# Patient Record
Sex: FEMALE | URBAN - METROPOLITAN AREA
[De-identification: names, ages, dates, MRNs, and addresses within clinical notes are randomized per-mention and may not be internally consistent; named-entity substitution may affect disease eponyms.]

---

## 2021-01-20 ENCOUNTER — IMMUNIZATIONS (OUTPATIENT)
Dept: FAMILY MEDICINE CLINIC | Facility: HOSPITAL | Age: 51
End: 2021-01-20

## 2021-01-20 DIAGNOSIS — Z23 ENCOUNTER FOR IMMUNIZATION: Primary | ICD-10-CM

## 2021-01-20 PROCEDURE — 0001A SARS-COV-2 / COVID-19 MRNA VACCINE (PFIZER-BIONTECH) 30 MCG: CPT

## 2021-01-20 PROCEDURE — 91300 SARS-COV-2 / COVID-19 MRNA VACCINE (PFIZER-BIONTECH) 30 MCG: CPT

## 2021-02-12 ENCOUNTER — IMMUNIZATIONS (OUTPATIENT)
Dept: FAMILY MEDICINE CLINIC | Facility: HOSPITAL | Age: 51
End: 2021-02-12

## 2021-02-12 DIAGNOSIS — Z23 ENCOUNTER FOR IMMUNIZATION: Primary | ICD-10-CM

## 2021-02-12 PROCEDURE — 0002A SARS-COV-2 / COVID-19 MRNA VACCINE (PFIZER-BIONTECH) 30 MCG: CPT

## 2021-02-12 PROCEDURE — 91300 SARS-COV-2 / COVID-19 MRNA VACCINE (PFIZER-BIONTECH) 30 MCG: CPT

## 2021-07-01 ENCOUNTER — NEW PATIENT (OUTPATIENT)
Dept: URBAN - METROPOLITAN AREA CLINIC 44 | Facility: CLINIC | Age: 51
End: 2021-07-01

## 2021-07-01 DIAGNOSIS — H52.13: ICD-10-CM

## 2021-07-01 DIAGNOSIS — H43.811: ICD-10-CM

## 2021-07-01 DIAGNOSIS — H26.003: ICD-10-CM

## 2021-07-01 DIAGNOSIS — H35.371: ICD-10-CM

## 2021-07-01 PROCEDURE — 99244 OFF/OP CNSLTJ NEW/EST MOD 40: CPT

## 2021-07-01 PROCEDURE — 92201 OPSCPY EXTND RTA DRAW UNI/BI: CPT

## 2021-07-01 PROCEDURE — 92134 CPTRZ OPH DX IMG PST SGM RTA: CPT

## 2021-07-01 ASSESSMENT — VISUAL ACUITY
OD_CC: 20/30
OS_CC: 20/25

## 2021-07-01 ASSESSMENT — TONOMETRY
OS_IOP_MMHG: 15
OD_IOP_MMHG: 16

## 2023-11-16 ENCOUNTER — OCCMED (OUTPATIENT)
Dept: URGENT CARE | Facility: CLINIC | Age: 53
End: 2023-11-16

## 2023-11-16 ENCOUNTER — APPOINTMENT (OUTPATIENT)
Dept: LAB | Facility: CLINIC | Age: 53
End: 2023-11-16

## 2023-11-16 DIAGNOSIS — Z02.1 PRE-EMPLOYMENT HEALTH SCREENING EXAMINATION: ICD-10-CM

## 2023-11-16 DIAGNOSIS — Z02.1 PRE-EMPLOYMENT HEALTH SCREENING EXAMINATION: Primary | ICD-10-CM

## 2023-11-16 LAB
MEV IGG SER QL IA: ABNORMAL
MUV IGG SER QL IA: NORMAL
RUBV IGG SERPL IA-ACNC: 52 IU/ML
VZV IGG SER QL IA: NORMAL

## 2023-11-16 PROCEDURE — 86762 RUBELLA ANTIBODY: CPT

## 2023-11-16 PROCEDURE — 86735 MUMPS ANTIBODY: CPT

## 2023-11-16 PROCEDURE — 36415 COLL VENOUS BLD VENIPUNCTURE: CPT

## 2023-11-16 PROCEDURE — 86480 TB TEST CELL IMMUN MEASURE: CPT

## 2023-11-16 PROCEDURE — 86787 VARICELLA-ZOSTER ANTIBODY: CPT

## 2023-11-16 PROCEDURE — 86765 RUBEOLA ANTIBODY: CPT

## 2023-11-17 LAB
GAMMA INTERFERON BACKGROUND BLD IA-ACNC: <0 IU/ML
M TB IFN-G BLD-IMP: NEGATIVE
M TB IFN-G CD4+ BCKGRND COR BLD-ACNC: 0 IU/ML
M TB IFN-G CD4+ BCKGRND COR BLD-ACNC: 0 IU/ML
MITOGEN IGNF BCKGRD COR BLD-ACNC: 10 IU/ML

## 2024-11-13 ENCOUNTER — OFFICE VISIT (OUTPATIENT)
Dept: FAMILY MEDICINE CLINIC | Facility: CLINIC | Age: 54
End: 2024-11-13
Payer: COMMERCIAL

## 2024-11-13 VITALS
HEART RATE: 63 BPM | HEIGHT: 63 IN | DIASTOLIC BLOOD PRESSURE: 70 MMHG | RESPIRATION RATE: 18 BRPM | OXYGEN SATURATION: 97 % | SYSTOLIC BLOOD PRESSURE: 122 MMHG | TEMPERATURE: 97 F | BODY MASS INDEX: 21.44 KG/M2 | WEIGHT: 121 LBS

## 2024-11-13 DIAGNOSIS — Z13.29 SCREENING FOR THYROID DISORDER: ICD-10-CM

## 2024-11-13 DIAGNOSIS — Z11.4 SCREENING FOR HIV (HUMAN IMMUNODEFICIENCY VIRUS): ICD-10-CM

## 2024-11-13 DIAGNOSIS — G57.01 NEUROPATHY OF RIGHT SCIATIC NERVE: Primary | ICD-10-CM

## 2024-11-13 DIAGNOSIS — Z13.6 SCREENING FOR CARDIOVASCULAR CONDITION: ICD-10-CM

## 2024-11-13 DIAGNOSIS — Z11.59 NEED FOR HEPATITIS C SCREENING TEST: ICD-10-CM

## 2024-11-13 DIAGNOSIS — Z86.2 HISTORY OF ANEMIA: ICD-10-CM

## 2024-11-13 PROCEDURE — 99203 OFFICE O/P NEW LOW 30 MIN: CPT | Performed by: NURSE PRACTITIONER

## 2024-11-13 RX ORDER — GABAPENTIN 300 MG/1
300 CAPSULE ORAL
COMMUNITY
Start: 2024-08-02

## 2024-11-13 RX ORDER — GABAPENTIN 100 MG/1
100 CAPSULE ORAL DAILY
COMMUNITY
Start: 2024-06-26

## 2024-11-13 NOTE — PROGRESS NOTES
Ambulatory Visit  Name: Mary Gonzalez      : 1970      MRN: [id number]  Encounter Provider: TERESA Pineda  Encounter Date: 2024   Encounter department: [facility]    Assessment & Plan  Neuropathy of right sciatic nerve  Has f/u with spine and ortho scheduled        Screening for cardiovascular condition    Orders:    CBC and differential; Future    Comprehensive metabolic panel; Future    Lipid Panel with LDL/HDL Ratio; Future    Screening for thyroid disorder    Orders:    TSH, 3rd generation with Free T4 reflex; Future    History of anemia    Orders:    Fe+TIBC+Palomo; Future    Need for hepatitis C screening test    Orders:    Hepatitis C antibody; Future    Screening for HIV (human immunodeficiency virus)    Orders:    Human Immunodeficiency Virus 1/2 Antigen / Antibody ( Fourth Generation) with Reflex Testing; Future       History of Present Illness     Here today to establish care.  She had a hysterectomy 6 months ago and had foot drop immediately following surgery.  She has had imaging and EMGs done, has had some improvement in function and sensation.  Has f/u arranged and will be going for second opinion at [facility] next week.   She has been in PT since then.  No other significant PMH.  She is requesting labs to f/u on her anemia since her surgery.        History obtained from : patient  Review of Systems   Constitutional: Negative.    Respiratory: Negative.     Cardiovascular: Negative.    Gastrointestinal: Negative.    Musculoskeletal:  Positive for back pain.   Neurological:  Positive for weakness and numbness.     Current Outpatient Medications on File Prior to Visit   Medication Sig Dispense Refill    gabapentin (NEURONTIN) 100 mg capsule Take 100 mg by mouth daily (Patient taking differently: Take 100 mg by mouth if needed)      gabapentin (NEURONTIN) 300 mg capsule Take 300 mg by mouth daily at bedtime       No current facility-administered medications on file prior  "to visit.      Social History     Tobacco Use    Smoking status: Never     Passive exposure: Never    Smokeless tobacco: Never   Vaping Use    Vaping status: Never Used   Substance and Sexual Activity    Alcohol use: Never    Drug use: Never    Sexual activity: Not on file         Objective     /70   Pulse 63   Temp (!) 97 °F (36.1 °C) (Skin)   Resp 18   Ht 5' 3\" (1.6 m)   Wt 54.9 kg (121 lb)   SpO2 97%   BMI 21.43 kg/m²     Physical Exam  Vitals and nursing note reviewed.   Constitutional:       General: She is not in acute distress.     Appearance: Normal appearance.   HENT:      Head: Normocephalic and atraumatic.   Eyes:      Conjunctiva/sclera: Conjunctivae normal.   Neck:      Vascular: No carotid bruit.   Cardiovascular:      Rate and Rhythm: Normal rate and regular rhythm.      Pulses: Normal pulses.      Heart sounds: Normal heart sounds. No murmur heard.  Pulmonary:      Effort: Pulmonary effort is normal.      Breath sounds: Normal breath sounds.   Skin:     General: Skin is warm and dry.   Neurological:      Mental Status: She is alert.   Psychiatric:         Mood and Affect: Mood normal.         Behavior: Behavior normal.         "

## 2024-11-27 LAB
FERRITIN SERPL-MCNC: 47 NG/ML (ref 15–150)
IRON SATN MFR SERPL: 24 % (ref 15–55)
IRON SERPL-MCNC: 78 UG/DL (ref 27–159)
TIBC SERPL-MCNC: 324 UG/DL (ref 250–450)
UIBC SERPL-MCNC: 246 UG/DL (ref 131–425)

## 2024-11-28 LAB
ALBUMIN SERPL-MCNC: 4.4 G/DL (ref 3.8–4.9)
ALP SERPL-CCNC: 81 IU/L (ref 44–121)
ALT SERPL-CCNC: 20 IU/L (ref 0–32)
AST SERPL-CCNC: 18 IU/L (ref 0–40)
BASOPHILS # BLD AUTO: 0 X10E3/UL (ref 0–0.2)
BASOPHILS NFR BLD AUTO: 0 %
BILIRUB SERPL-MCNC: 0.5 MG/DL (ref 0–1.2)
BUN SERPL-MCNC: 14 MG/DL (ref 6–24)
BUN/CREAT SERPL: 18 (ref 9–23)
CALCIUM SERPL-MCNC: 9.6 MG/DL (ref 8.7–10.2)
CHLORIDE SERPL-SCNC: 102 MMOL/L (ref 96–106)
CHOLEST SERPL-MCNC: 159 MG/DL (ref 100–199)
CO2 SERPL-SCNC: 26 MMOL/L (ref 20–29)
CREAT SERPL-MCNC: 0.79 MG/DL (ref 0.57–1)
EGFR: 90 ML/MIN/1.73
EOSINOPHIL # BLD AUTO: 0.1 X10E3/UL (ref 0–0.4)
EOSINOPHIL NFR BLD AUTO: 1 %
ERYTHROCYTE [DISTWIDTH] IN BLOOD BY AUTOMATED COUNT: 12.6 % (ref 11.7–15.4)
GLOBULIN SER-MCNC: 2.2 G/DL (ref 1.5–4.5)
GLUCOSE SERPL-MCNC: 85 MG/DL (ref 70–99)
HCT VFR BLD AUTO: 39.7 % (ref 34–46.6)
HCV AB S/CO SERPL IA: NON REACTIVE
HDLC SERPL-MCNC: 67 MG/DL
HGB BLD-MCNC: 12.2 G/DL (ref 11.1–15.9)
HIV 1+2 AB+HIV1 P24 AG SERPL QL IA: NON REACTIVE
IMM GRANULOCYTES # BLD: 0 X10E3/UL (ref 0–0.1)
IMM GRANULOCYTES NFR BLD: 0 %
LDLC SERPL CALC-MCNC: 81 MG/DL (ref 0–99)
LDLC/HDLC SERPL: 1.2 RATIO (ref 0–3.2)
LYMPHOCYTES # BLD AUTO: 0.6 X10E3/UL (ref 0.7–3.1)
LYMPHOCYTES NFR BLD AUTO: 13 %
MCH RBC QN AUTO: 27.6 PG (ref 26.6–33)
MCHC RBC AUTO-ENTMCNC: 30.7 G/DL (ref 31.5–35.7)
MCV RBC AUTO: 90 FL (ref 79–97)
MICRODELETION SYND BLD/T FISH: NORMAL
MONOCYTES # BLD AUTO: 0.3 X10E3/UL (ref 0.1–0.9)
MONOCYTES NFR BLD AUTO: 6 %
NEUTROPHILS # BLD AUTO: 3.9 X10E3/UL (ref 1.4–7)
NEUTROPHILS NFR BLD AUTO: 80 %
PLATELET # BLD AUTO: 261 X10E3/UL (ref 150–450)
POTASSIUM SERPL-SCNC: 4.5 MMOL/L (ref 3.5–5.2)
PROT SERPL-MCNC: 6.6 G/DL (ref 6–8.5)
RBC # BLD AUTO: 4.42 X10E6/UL (ref 3.77–5.28)
SL AMB VLDL CHOLESTEROL CALC: 11 MG/DL (ref 5–40)
SODIUM SERPL-SCNC: 141 MMOL/L (ref 134–144)
TRIGL SERPL-MCNC: 51 MG/DL (ref 0–149)
TSH SERPL DL<=0.005 MIU/L-ACNC: 1.44 UIU/ML (ref 0.45–4.5)
WBC # BLD AUTO: 4.9 X10E3/UL (ref 3.4–10.8)

## 2024-11-29 ENCOUNTER — RESULTS FOLLOW-UP (OUTPATIENT)
Dept: FAMILY MEDICINE CLINIC | Facility: CLINIC | Age: 54
End: 2024-11-29

## 2025-03-12 ENCOUNTER — OFFICE VISIT (OUTPATIENT)
Dept: URGENT CARE | Facility: CLINIC | Age: 55
End: 2025-03-12
Payer: COMMERCIAL

## 2025-03-12 VITALS
HEART RATE: 82 BPM | RESPIRATION RATE: 14 BRPM | BODY MASS INDEX: 21.97 KG/M2 | SYSTOLIC BLOOD PRESSURE: 112 MMHG | TEMPERATURE: 97 F | WEIGHT: 124 LBS | OXYGEN SATURATION: 97 % | DIASTOLIC BLOOD PRESSURE: 78 MMHG

## 2025-03-12 DIAGNOSIS — N39.0 URINARY TRACT INFECTION WITH HEMATURIA, SITE UNSPECIFIED: Primary | ICD-10-CM

## 2025-03-12 DIAGNOSIS — R31.9 URINARY TRACT INFECTION WITH HEMATURIA, SITE UNSPECIFIED: Primary | ICD-10-CM

## 2025-03-12 LAB
SL AMB  POCT GLUCOSE, UA: NEGATIVE
SL AMB LEUKOCYTE ESTERASE,UA: ABNORMAL
SL AMB POCT BILIRUBIN,UA: NEGATIVE
SL AMB POCT BLOOD,UA: ABNORMAL
SL AMB POCT CLARITY,UA: ABNORMAL
SL AMB POCT COLOR,UA: ABNORMAL
SL AMB POCT KETONES,UA: NEGATIVE
SL AMB POCT NITRITE,UA: POSITIVE
SL AMB POCT PH,UA: 6.5
SL AMB POCT SPECIFIC GRAVITY,UA: 1.02
SL AMB POCT URINE PROTEIN: 100
SL AMB POCT UROBILINOGEN: 0.2

## 2025-03-12 PROCEDURE — 99214 OFFICE O/P EST MOD 30 MIN: CPT

## 2025-03-12 PROCEDURE — 81002 URINALYSIS NONAUTO W/O SCOPE: CPT

## 2025-03-12 RX ORDER — NITROFURANTOIN 25; 75 MG/1; MG/1
100 CAPSULE ORAL 2 TIMES DAILY
Qty: 10 CAPSULE | Refills: 0 | Status: SHIPPED | OUTPATIENT
Start: 2025-03-12 | End: 2025-03-17

## 2025-03-12 NOTE — PROGRESS NOTES
[facility]        NAME: Mary Gonzalez is a 54 y.o. female  : 1970    MRN: [id number]  DATE: 2025  TIME: 7:58 PM    Assessment and Plan   Urinary tract infection with hematuria, site unspecified [N39.0, R31.9]  1. Urinary tract infection with hematuria, site unspecified  Urine culture    POCT urine dip    nitrofurantoin (MACROBID) 100 mg capsule    Urine culture        Urine dip with moderate leukocytes, positive nitrates, and large blood. Will treat and send for culture.     Patient Instructions     Urinary Tract Infection in Women   WHAT YOU NEED TO KNOW:   A urinary tract infection (UTI) is caused by bacteria that get inside your urinary tract. Most bacteria that enter your urinary tract come out when you urinate. If the bacteria stay in your urinary tract, you may get an infection. Your urinary tract includes your kidneys, ureters, bladder, and urethra. Urine is made in your kidneys, and it flows from the ureters to the bladder. Urine leaves the bladder through the urethra. A UTI is more common in your lower urinary tract, which includes your bladder and urethra.      DISCHARGE INSTRUCTIONS:   Return to the emergency department if:   You are urinating very little or not at all.     You have a high fever with shaking chills.      You have side or back pain that gets worse.     Call your doctor if:   You have a fever.     You do not feel better after 2 days of taking antibiotics.     You are vomiting.      You have questions or concerns about your condition or care.     Medicines:   Antibiotics  help fight a bacterial infection. If you have UTIs often (called recurrent UTIs), you may be given antibiotics to take regularly. You will be given directions for when and how to use antibiotics. The goal is to prevent UTIs but not cause antibiotic resistance by using antibiotics too often.     Medicines  may be given to decrease pain and burning when you urinate. They will also help decrease  the feeling that you need to urinate often. These medicines will make your urine orange or red.     Take your medicine as directed.  Contact your healthcare provider if you think your medicine is not helping or if you have side effects. Tell him or her if you are allergic to any medicine. Keep a list of the medicines, vitamins, and herbs you take. Include the amounts, and when and why you take them. Bring the list or the pill bottles to follow-up visits. Carry your medicine list with you in case of an emergency.     Follow up with your healthcare provider as directed:  Write down your questions so you remember to ask them during your visits.   Prevent another UTI:   Empty your bladder often.  Urinate and empty your bladder as soon as you feel the need. Do not hold your urine for long periods of time.     Wipe from front to back after you urinate or have a bowel movement.  This will help prevent germs from getting into your urinary tract through your urethra.     Drink liquids as directed.  Ask how much liquid to drink each day and which liquids are best for you. You may need to drink more liquids than usual to help flush out the bacteria. Do not drink alcohol, caffeine, or citrus juices. These can irritate your bladder and increase your symptoms. Your healthcare provider may recommend cranberry juice to help prevent a UTI.     Urinate after you have sex.  This can help flush out bacteria passed during sex.     Do not douche or use feminine deodorants.  These can change the chemical balance in your vagina.     Change sanitary pads or tampons often.  This will help prevent germs from getting into your urinary tract.      Talk to your healthcare provider about your birth control method.  You may need to change your method if it is increasing your risk for UTIs.     Wear cotton underwear and clothes that are loose.  Tight pants and nylon underwear can trap moisture and cause bacteria to grow.     Vaginal estrogen may be  recommended.  This medicine helps prevent UTIs in women who have gone through menopause or are in oliva-menopause.     Do pelvic muscle exercises often.  Pelvic muscle exercises may help you start and stop urinating. Strong pelvic muscles may help you empty your bladder easier. Squeeze these muscles tightly for 5 seconds like you are trying to hold back urine. Then relax for 5 seconds. Gradually work up to squeezing for 10 seconds. Do 3 sets of 15 repetitions a day, or as directed.     © Copyright [employer] 2021 Information is for End User's use only and may not be sold, redistributed or otherwise used for commercial purposes. All illustrations and images included in CareNotes® are the copyrighted property of [employer]. or [employer]  The above information is an  only. It is not intended as medical advice for individual conditions or treatments. Talk to your doctor, nurse or pharmacist before following any medical regimen to see if it is safe and effective for you.    Follow up with PCP in 3-5 days.  Proceed to  ER if symptoms worsen.    If tests are performed, our office will contact you with results only if changes need to made to the care plan discussed with you at the visit. You can review your full results on [facility] Mychart.    Chief Complaint     Chief Complaint   Patient presents with    Possible UTI     Pt presents with burning sensation that started this evening         History of Present Illness       Urinary Tract Infection   This is a new problem. The current episode started today. The problem occurs every urination. The problem has been unchanged. The quality of the pain is described as burning. The pain is moderate. There has been no fever. Associated symptoms include frequency and hematuria. Pertinent negatives include no chills, nausea or vomiting. She has tried nothing for the symptoms.       Review of Systems   Review of Systems   Constitutional:  Negative for  chills and fever.   HENT:  Negative for congestion, postnasal drip, rhinorrhea, sinus pressure, sore throat and trouble swallowing.    Respiratory:  Negative for cough, chest tightness and shortness of breath.    Cardiovascular:  Negative for chest pain and palpitations.   Gastrointestinal:  Negative for abdominal pain, nausea and vomiting.   Genitourinary:  Positive for dysuria, frequency and hematuria. Negative for difficulty urinating.   Musculoskeletal:  Negative for myalgias.   Neurological:  Negative for dizziness and headaches.         Current Medications       Current Outpatient Medications:     nitrofurantoin (MACROBID) 100 mg capsule, Take 1 capsule (100 mg total) by mouth 2 (two) times a day for 5 days, Disp: 10 capsule, Rfl: 0    Current Allergies     Allergies as of 03/12/2025    (No Known Allergies)            The following portions of the patient's history were reviewed and updated as appropriate: allergies, current medications, past family history, past medical history, past social history, past surgical history and problem list.     History reviewed. No pertinent past medical history.    Past Surgical History:   Procedure Laterality Date    HYSTERECTOMY      ovaries and cervix spared    TONSILLECTOMY AND ADENOIDECTOMY         Family History   Problem Relation Age of Onset    COPD Mother     COPD Father     Coronary artery disease Father     Lupus Sister     Rheum arthritis Sister     Colon cancer Maternal Grandmother     Thyroid disease Maternal Grandmother     Schizophrenia Paternal Grandmother     Diabetes Paternal Uncle          Medications have been verified.        Objective   /78   Pulse 82   Temp (!) 97 °F (36.1 °C)   Resp 14   Wt 56.2 kg (124 lb)   LMP  (LMP Unknown)   SpO2 97%   BMI 21.97 kg/m²        Physical Exam     Physical Exam  Constitutional:       General: She is not in acute distress.  HENT:      Head: Normocephalic.      Nose: Nose normal.   Eyes:      Pupils: Pupils  are equal, round, and reactive to light.   Cardiovascular:      Rate and Rhythm: Normal rate and regular rhythm.      Pulses: Normal pulses.      Heart sounds: Normal heart sounds.   Pulmonary:      Effort: Pulmonary effort is normal.      Breath sounds: Normal breath sounds.   Abdominal:      General: Abdomen is flat. There is no distension.      Palpations: Abdomen is soft.      Tenderness: There is no abdominal tenderness. There is no right CVA tenderness, left CVA tenderness or guarding.   Musculoskeletal:         General: Normal range of motion.   Skin:     General: Skin is warm and dry.      Capillary Refill: Capillary refill takes less than 2 seconds.   Neurological:      Mental Status: She is alert and oriented to person, place, and time.

## 2025-03-12 NOTE — PATIENT INSTRUCTIONS
Urinary Tract Infection in Women   WHAT YOU NEED TO KNOW:   A urinary tract infection (UTI) is caused by bacteria that get inside your urinary tract. Most bacteria that enter your urinary tract come out when you urinate. If the bacteria stay in your urinary tract, you may get an infection. Your urinary tract includes your kidneys, ureters, bladder, and urethra. Urine is made in your kidneys, and it flows from the ureters to the bladder. Urine leaves the bladder through the urethra. A UTI is more common in your lower urinary tract, which includes your bladder and urethra.      DISCHARGE INSTRUCTIONS:   Return to the emergency department if:   You are urinating very little or not at all.     You have a high fever with shaking chills.      You have side or back pain that gets worse.     Call your doctor if:   You have a fever.     You do not feel better after 2 days of taking antibiotics.     You are vomiting.      You have questions or concerns about your condition or care.     Medicines:   Antibiotics  help fight a bacterial infection. If you have UTIs often (called recurrent UTIs), you may be given antibiotics to take regularly. You will be given directions for when and how to use antibiotics. The goal is to prevent UTIs but not cause antibiotic resistance by using antibiotics too often.     Medicines  may be given to decrease pain and burning when you urinate. They will also help decrease the feeling that you need to urinate often. These medicines will make your urine orange or red.     Take your medicine as directed.  Contact your healthcare provider if you think your medicine is not helping or if you have side effects. Tell him or her if you are allergic to any medicine. Keep a list of the medicines, vitamins, and herbs you take. Include the amounts, and when and why you take them. Bring the list or the pill bottles to follow-up visits. Carry your medicine list with you in case of an emergency.     Follow up with  your healthcare provider as directed:  Write down your questions so you remember to ask them during your visits.   Prevent another UTI:   Empty your bladder often.  Urinate and empty your bladder as soon as you feel the need. Do not hold your urine for long periods of time.     Wipe from front to back after you urinate or have a bowel movement.  This will help prevent germs from getting into your urinary tract through your urethra.     Drink liquids as directed.  Ask how much liquid to drink each day and which liquids are best for you. You may need to drink more liquids than usual to help flush out the bacteria. Do not drink alcohol, caffeine, or citrus juices. These can irritate your bladder and increase your symptoms. Your healthcare provider may recommend cranberry juice to help prevent a UTI.     Urinate after you have sex.  This can help flush out bacteria passed during sex.     Do not douche or use feminine deodorants.  These can change the chemical balance in your vagina.     Change sanitary pads or tampons often.  This will help prevent germs from getting into your urinary tract.      Talk to your healthcare provider about your birth control method.  You may need to change your method if it is increasing your risk for UTIs.     Wear cotton underwear and clothes that are loose.  Tight pants and nylon underwear can trap moisture and cause bacteria to grow.     Vaginal estrogen may be recommended.  This medicine helps prevent UTIs in women who have gone through menopause or are in oliva-menopause.     Do pelvic muscle exercises often.  Pelvic muscle exercises may help you start and stop urinating. Strong pelvic muscles may help you empty your bladder easier. Squeeze these muscles tightly for 5 seconds like you are trying to hold back urine. Then relax for 5 seconds. Gradually work up to squeezing for 10 seconds. Do 3 sets of 15 repetitions a day, or as directed.     © Copyright [employer] 2021 Information  is for End User's use only and may not be sold, redistributed or otherwise used for commercial purposes. All illustrations and images included in CareNotes® are the copyrighted property of [employer] or [employer]  The above information is an  only. It is not intended as medical advice for individual conditions or treatments. Talk to your doctor, nurse or pharmacist before following any medical regimen to see if it is safe and effective for you.

## 2025-03-17 LAB
BACTERIA UR CULT: ABNORMAL
Lab: ABNORMAL
SL AMB ANTIMICROBIAL SUSCEPTIBILITY: ABNORMAL

## 2025-05-30 ENCOUNTER — RA CDI HCC (OUTPATIENT)
Dept: OTHER | Facility: HOSPITAL | Age: 55
End: 2025-05-30

## 2025-06-05 ENCOUNTER — OFFICE VISIT (OUTPATIENT)
Dept: FAMILY MEDICINE CLINIC | Facility: CLINIC | Age: 55
End: 2025-06-05
Payer: COMMERCIAL

## 2025-06-05 ENCOUNTER — TELEPHONE (OUTPATIENT)
Dept: ADMINISTRATIVE | Facility: OTHER | Age: 55
End: 2025-06-05

## 2025-06-05 VITALS
RESPIRATION RATE: 16 BRPM | HEIGHT: 63 IN | WEIGHT: 129 LBS | BODY MASS INDEX: 22.86 KG/M2 | SYSTOLIC BLOOD PRESSURE: 118 MMHG | HEART RATE: 56 BPM | TEMPERATURE: 96.7 F | DIASTOLIC BLOOD PRESSURE: 80 MMHG

## 2025-06-05 DIAGNOSIS — G57.31 PERONEAL NERVE PALSY, RIGHT: ICD-10-CM

## 2025-06-05 DIAGNOSIS — N95.1 MENOPAUSAL STATE: Primary | ICD-10-CM

## 2025-06-05 DIAGNOSIS — Z12.31 SCREENING MAMMOGRAM FOR BREAST CANCER: ICD-10-CM

## 2025-06-05 PROCEDURE — 99213 OFFICE O/P EST LOW 20 MIN: CPT | Performed by: NURSE PRACTITIONER

## 2025-06-05 RX ORDER — ESTRADIOL 0.03 MG/D
1 FILM, EXTENDED RELEASE TRANSDERMAL 2 TIMES WEEKLY
COMMUNITY
Start: 2025-04-01 | End: 2025-06-05 | Stop reason: SDUPTHER

## 2025-06-05 RX ORDER — ESTRADIOL 0.03 MG/D
1 FILM, EXTENDED RELEASE TRANSDERMAL 2 TIMES WEEKLY
Qty: 8 PATCH | Refills: 3 | Status: SHIPPED | OUTPATIENT
Start: 2025-06-05

## 2025-06-05 RX ORDER — GABAPENTIN 300 MG/1
300 CAPSULE ORAL
COMMUNITY

## 2025-06-05 NOTE — PROGRESS NOTES
"Name: Mary Gonzalez      : 1970      MRN: [id number]  Encounter Provider: TERESA Pineda  Encounter Date: 2025   Encounter department: [facility]  :  Assessment & Plan  Menopausal state  Estradiol refilled   Orders:    estradiol (VIVELLE-DOT) 0.025 MG/24HR; Place 1 patch on the skin 2 (two) times a week    Peroneal nerve palsy, right  She will resume Gabapentin 300mg at bedtime.  Discussed adding in AM or afternoon dose if needed       Screening mammogram for breast cancer    Orders:    Mammo screening bilateral w 3d and cad; Future           History of Present Illness   Here today for follow up.     She is still dealing with peroneal nerve injury- d/t hysterectomy.  Did 9 months of PT.  She does have some movement back of her right foot, but still has considerable foot drop.  She used to consistently workout, however got frustrated with her inability to exercise, so she stopped altogether.  She recently started exercising again, and this exacerbates the nerve pain in her right leg.  Was previously on gabapentin, but stopped this.  Wondering if she should resume this.  Requesting refill on estradiol, her GYN is retiring      Review of Systems   Constitutional: Negative.    Genitourinary:         See HPI   Musculoskeletal:         See HPI   Neurological:  Positive for weakness and numbness.       Objective   /80   Pulse 56   Temp (!) 96.7 °F (35.9 °C)   Resp 16   Ht 5' 3\" (1.6 m)   Wt 58.5 kg (129 lb)   LMP  (LMP Unknown)   BMI 22.85 kg/m²      Physical Exam  Vitals and nursing note reviewed.   Constitutional:       General: She is not in acute distress.     Appearance: Normal appearance.   HENT:      Head: Normocephalic and atraumatic.     Eyes:      Conjunctiva/sclera: Conjunctivae normal.     Neck:      Vascular: No carotid bruit.     Cardiovascular:      Rate and Rhythm: Normal rate and regular rhythm.      Pulses: Normal pulses.      Heart sounds: Normal heart " sounds. No murmur heard.  Pulmonary:      Effort: Pulmonary effort is normal.      Breath sounds: Normal breath sounds.     Skin:     General: Skin is warm and dry.     Neurological:      Mental Status: She is alert.     Psychiatric:         Mood and Affect: Mood normal.         Behavior: Behavior normal.

## 2025-06-05 NOTE — ASSESSMENT & PLAN NOTE
She will resume Gabapentin 300mg at bedtime.  Discussed adding in AM or afternoon dose if needed

## 2025-06-05 NOTE — LETTER
Procedure Request Form: Colonoscopy      Date Requested: 25  Patient: Mary Kempe  Patient : 1970   Referring Provider: TERESA Pineda        Date of Procedure _______ report_______________________       The above patient has informed us that they have completed their   most recent Colonoscopy at your facility. Please complete   this form and attach all corresponding procedure reports/results.    Comments __________________________________________________________  ____________________________________________________________________  ____________________________________________________________________  ____________________________________________________________________    Facility Completing Procedure _________________________________________    Form Completed By (print name) _______________________________________      Signature __________________________________________________________      These reports are needed for  compliance.    Please fax this completed form and a copy of the procedure report to the [facility] Based Department as soon as possible via Fax [phone], attention Xavier: Phone [phone]. Our office is located at [address] [place] [address].    We thank you for your assistance in treating our mutual patient.

## 2025-06-05 NOTE — TELEPHONE ENCOUNTER
Upon review of the In Basket request and the patient's chart, initial outreach has been made via fax to facility. Please see Contacts section for details.     Thank you  Xavier Abreu MA

## 2025-06-05 NOTE — TELEPHONE ENCOUNTER
----- Message from TERESA Pineda sent at 6/5/2025  9:48 AM EDT -----  06/05/25 9:48 Jenniffer, our patient No patient name on file. has had CRC: Colonoscopy completed/performed. Please assist in updating the patient chart by making an External outreach to [facility]  facility located in [place]. The date of service is 2024.Thank you,[facility]

## 2025-06-06 NOTE — TELEPHONE ENCOUNTER
Upon review of the In Basket request we were able to locate, review, and update the patient chart as requested for CRC: Colonoscopy.    Any additional questions or concerns should be emailed to the Practice Liaisons via the appropriate education email address, please do not reply via In Basket.    Thank you  Xavier Abreu MA   [facility]

## 2025-06-07 ENCOUNTER — OFFICE VISIT (OUTPATIENT)
Dept: URGENT CARE | Facility: CLINIC | Age: 55
End: 2025-06-07
Payer: COMMERCIAL

## 2025-06-07 VITALS
OXYGEN SATURATION: 97 % | HEART RATE: 66 BPM | SYSTOLIC BLOOD PRESSURE: 106 MMHG | TEMPERATURE: 97.5 F | DIASTOLIC BLOOD PRESSURE: 66 MMHG | HEIGHT: 63 IN | BODY MASS INDEX: 22.43 KG/M2 | RESPIRATION RATE: 16 BRPM | WEIGHT: 126.6 LBS

## 2025-06-07 DIAGNOSIS — M25.512 ACUTE PAIN OF LEFT SHOULDER: ICD-10-CM

## 2025-06-07 PROCEDURE — 99213 OFFICE O/P EST LOW 20 MIN: CPT | Performed by: PHYSICIAN ASSISTANT

## 2025-06-07 RX ORDER — CYCLOBENZAPRINE HCL 10 MG
10 TABLET ORAL
Qty: 5 TABLET | Refills: 0 | Status: SHIPPED | OUTPATIENT
Start: 2025-06-07 | End: 2025-06-12

## 2025-06-07 NOTE — PATIENT INSTRUCTIONS
Patient Education     Frozen Shoulder Exercises   About this topic   A frozen shoulder is a condition where you cannot move your shoulder because of pain and stiffness. The area around your shoulder joint becomes tight and the capsule around the joint thickens. You may have a frozen shoulder if you are not able to move your arm or shoulder for a long time. This may be because you have a broken arm or from shoulder surgery. Sometimes a frozen shoulder happens for no apparent reason.  There may be some movements that are harder for you to do than others. You may have problems when you:  Rotate your arm outward  Lift your shoulder to the side or front  Move your arm behind your back  Exercises can help ease the pain and make this problem better.  General   Before starting with a program, ask your doctor if you are healthy enough to do these exercises. Your doctor may have you work with a  or physical therapist to make a safe exercise program to meet your needs.  Stretching Exercises   Stretching exercises keep your muscles flexible. They also stop them from getting tight. Start by doing each of these stretches 2 to 3 times. In order for your body to make changes, you will need to hold these stretches for 20 to 30 seconds. Try to do the stretches 2 to 3 times each day. Do all exercises slowly. Stretches may be slightly uncomfortable. If you have any sharp pain, back off a little and do not push as far.  Table stretches ? Sitting on an office chair with wheels works well for these exercises. If you don’t have a chair with wheels, use a regular chair and place a towel under your arm. Sit about a foot away from the table or desk.  Forward ? Put your arm on the table. Keep your elbow straight and lean forward until you feel a stretch. Now, lean further forward or push the chair backwards to get more of a stretch.  Sideways ? Turn the chair sideways. Put your arm on the table. Make sure your thumb is pointed up. Keep  your elbow straight and lean towards the table until you feel a stretch. Now, lean further or push the chair away from the table to get more of a stretch.  Bent arm rotations ? Turn the chair sideways. Put your lower arm on the table with your elbow bent in a 90 degree angle. Lean forward until you feel a stretch.  External rotation door stretches ?  an open doorway. Bend your elbow to 90 degrees and put your hand on the door frame. Turn your body outwards until you feel a stretch in the shoulder.  Back shoulder stretches ? Stand up straight with legs wide apart or sit up tall on a chair. Cross your arm across your body at shoulder level. Using the other arm, pull the crossed arm towards the other shoulder. Repeat using the other arm.  Internal rotation towel stretches ? To stretch your left shoulder, put your left hand behind your back at your waist. Toss a towel over your right shoulder. Grab the bottom of the towel with your left hand. Gently, pull the towel up with your right hand, allowing your left hand to slide up your spine. Pull until you feel a good stretch at the front of your shoulder. Switch hands to stretch your right shoulder.  Cane shoulder exercises:  Overhead flexions ? Lie down or stand and grab the cane with both hands. Start with your arms straight and the cane resting on your upper legs. Keep your arms straight and lift the cane over your head.  Abductions or side-to-side motion ? Stand and grab the cane with both hands. Turn your thumbs to the right to stretch your right shoulder. Start with your arms straight and the cane resting on your upper legs. Push the cane to the right, raising your right arm. Keep your right elbow straight. Keep pushing until you feel a good stretch. Switch the position of your hands to point your thumbs to the left and push the cane left to stretch your left shoulder.  External rotations or side-to-side rotations ? Lie down or stand and grab the cane with  both hands. Start with your elbows bent and touching your body. Put the tip of the cane in the palm of your right hand to stretch your right shoulder. Grab the cane at the other end with your left hand. Push the cane sideways into your right palm until you feel a stretch in your shoulder. Be sure to keep your right elbow close to your body while you are stretching. Switch hands to stretch the left shoulder.  Your doctor may also want you to use an overhead pulley for exercises.             What will the results be?   Less pain and stiffness  Better range of motion and flexibility  Easier to do daily activities   Helpful tips   Stay active and work out to keep your muscles strong and flexible.  Eat a healthy diet to keep your muscles healthy.  Be sure you do not hold your breath when exercising. This can raise your blood pressure. If you tend to hold your breath, try counting out loud when exercising. If any exercise bothers you, stop right away.  Always warm up before stretching. Heated muscles stretch much easier than cool muscles. Stretching cool muscles can lead to injury.  Try swinging your arms at an easy pace for a few minutes to warm up your muscles. Do this again after exercising.  Never bounce when doing stretches.  After exercising, it is a good idea to use ice. Place an ice pack or a bag of frozen peas wrapped in a towel over the painful part. Never put ice right on the skin. Do not leave the ice on more than 10 to 15 minutes at a time. Ice after activity may help decrease pain and swelling. Never ice before stretching.  Doing exercises before a meal may be a good way to get into a routine.  Exercise may be slightly uncomfortable, but you should not have sharp pains. If you do get sharp pains, stop what you are doing. If the sharp pains continue, call your doctor.  Water exercises or swimming may help ease shoulder stiffness.  Last Reviewed Date   2021-06-28  Consumer Information Use and Disclaimer   This  generalized information is a limited summary of diagnosis, treatment, and/or medication information. It is not meant to be comprehensive and should be used as a tool to help the user understand and/or assess potential diagnostic and treatment options. It does NOT include all information about conditions, treatments, medications, side effects, or risks that may apply to a specific patient. It is not intended to be medical advice or a substitute for the medical advice, diagnosis, or treatment of a health care provider based on the health care provider's examination and assessment of a patient’s specific and unique circumstances. Patients must speak with a health care provider for complete information about their health, medical questions, and treatment options, including any risks or benefits regarding use of medications. This information does not endorse any treatments or medications as safe, effective, or approved for treating a specific patient. UpToDate, Inc. and its affiliates disclaim any warranty or liability relating to this information or the use thereof. The use of this information is governed by the Terms of Use, available at https://www.InstaGIS.com/en/know/clinical-effectiveness-terms   Copyright   Copyright © 2024 UpToDate, Inc. and its affiliates and/or licensors. All rights reserved.    Patient Education     Frozen shoulder   The Basics   Written by the doctors and editors at [employer]   What is a frozen shoulder? -- Frozen shoulder is a condition that causes the shoulder to be stiff and hard to move. When a person has a frozen shoulder, the tissue around the shoulder joint gets thick and tight.  A person might get frozen shoulder after a shoulder injury or surgery. It is also more common in people with certain health conditions, such as diabetes.  What are the symptoms of a frozen shoulder? -- People with a frozen shoulder usually have:   A stiff and painful shoulder   Trouble reaching overhead or  "around to their lower back  Will I need tests? -- Probably not. Your doctor or nurse will talk with you and do an exam. Often, that is all that is needed to diagnose frozen shoulder. But your doctor or nurse might want to do an imaging test. Imaging tests create pictures of the inside of the body. Examples include:   X-ray   MRI scan   Ultrasound  How can I care for myself at home? -- In most cases, a frozen shoulder will get better on its own. But it can take months to heal completely.  To help your shoulder get better, you can:   Rest your shoulder - Avoiding raising your arm overhead, reaching, and lifting things.   Take a pain-relieving medicine - Ask your doctor or nurse about taking an over-the-counter medicine for pain. Examples include acetaminophen (sample brand name: Tylenol), ibuprofen (sample brand names: Advil, Motrin), or naproxen (sample brand name: Aleve).   Ask about exercises that can help - At first, the most important thing to do is rest your shoulder. Later, when the worst of the pain has passed, ask your doctor if it's safe to start trying some simple exercises. If they say it's OK, you can slowly start to do certain exercises (picture 1). It can also help to work with a physical therapist (exercise expert).  After you are comfortable with simple \"range of motion\" exercises, your doctor, nurse, or physical therapist can suggest other exercises. These can help make your shoulder muscles stronger. They can show you how to do these exercises and tell you how often to do them.  Any time you do shoulder exercises, start slowly and make the exercises harder over time. Also, know that some pain is normal, but it's important not to push it. If you have sharp or tearing pain, stop what you're doing and tell your doctor or nurse.   Get a shot of medicine into your shoulder - Your doctor might recommend this in addition to doing exercises.  What if my shoulder doesn't get better? -- If your symptoms don't " get better, your doctor might suggest surgery. Usually, other treatments are tried for at least 10 to 12 months before surgery is considered.  All topics are updated as new evidence becomes available and our peer review process is complete.  This topic retrieved from [employer] on: Feb 26, 2024.  Topic 30768 Version 12.0  Release: 32.2.4 - C32.56  © 2024 UpToDate, Inc. and/or its affiliates. All rights reserved.  picture 1: Exercises to do for frozen shoulder early in healing process     Graphic 18895 Version 2.0  Consumer Information Use and Disclaimer   Disclaimer: This generalized information is a limited summary of diagnosis, treatment, and/or medication information. It is not meant to be comprehensive and should be used as a tool to help the user understand and/or assess potential diagnostic and treatment options. It does NOT include all information about conditions, treatments, medications, side effects, or risks that may apply to a specific patient. It is not intended to be medical advice or a substitute for the medical advice, diagnosis, or treatment of a health care provider based on the health care provider's examination and assessment of a patient's specific and unique circumstances. Patients must speak with a health care provider for complete information about their health, medical questions, and treatment options, including any risks or benefits regarding use of medications. This information does not endorse any treatments or medications as safe, effective, or approved for treating a specific patient. UpToDate, Inc. and its affiliates disclaim any warranty or liability relating to this information or the use thereof.The use of this information is governed by the Terms of Use, available at https://www.wolterskluwer.com/en/know/clinical-effectiveness-terms. 2024© UpToDate, Inc. and its affiliates and/or licensors. All rights reserved.  Copyright   © 2024 UpToDate, Inc. and/or its affiliates. All rights  "reserved.    Patient Education     Shoulder pain   The Basics   Written by the doctors and editors at [facility]   What are the parts of the shoulder? -- The shoulder joint is made up of the upper arm bone, collarbone, and shoulder blade. It includes muscles, ligaments, tendons, and bursae (figure 1). All of these parts work together to help the shoulder move comfortably in different directions.  What can cause shoulder pain? -- Shoulder pain can happen when you damage or injure any of the different parts of the shoulder.  Different conditions can cause shoulder pain. They include:   Bursitis - This is a condition that can cause pain or swelling next to a joint. A \"bursa\" is a small fluid-filled sac that sits near a bone. It cushions and protects nearby tissues when they rub on or slide over bones. Bursitis is when a bursa gets irritated and swollen.   Frozen shoulder - This is a condition that causes the shoulder to be stiff, painful, and hard to move. If you have a frozen shoulder, the tissue around the shoulder joint gets thick and tight. This might happen after a shoulder gets injury or surgery.   Rotator cuff injury - The rotator cuff is made up of 4 shoulder muscles and their tendons. Tendons are strong bands of tissue that connect muscles to bones. Rotator cuff injuries cause pain in your shoulder and sometimes in your upper arm.   Shoulder impingement - This happens when a muscle, tendon, or bursa gets squeezed between bones.    shoulder - This happens when certain ligaments tear or get stretched too much. Ligaments are strong bands of tissue that connect bone to bone. The most common causes of a  shoulder are falling on the shoulder or getting hit in the shoulder. A  shoulder can be mild or severe, depending on how many ligaments are torn.   Osteoarthritis - This is a common condition that often comes with age. The cartilage in the joints wears down, causing the bones to rub against " "each other. This can cause pain, stiffness, and swelling in the shoulder joints.  Will I need tests? -- Maybe. Your doctor or nurse will talk with you and do an exam. They might also do an imaging test of your shoulder such as an X-ray, MRI, or ultrasound. Imaging tests create pictures of the inside of the body.  How is shoulder pain treated? -- Many causes of shoulder pain get better on their own. But it can take weeks to months to heal completely.  Your doctor might recommend:   Pain-relieving medicines called \"nonsteroidal anti-inflammatory drugs\" (NSAIDs) - These include ibuprofen (sample brand names: Advil, Motrin) and naproxen (sample brand name: Aleve). They can reduce pain and swelling.   Exercises and stretches - It can help to work with a physical therapist (exercise expert). They can teach you gentle stretches and exercises to help with your symptoms. Follow the physical therapist's advice for how often and when to do the exercises.   Steroid injections - Steroid medicines help reduce inflammation. Doctors can inject steroids into the shoulder to help reduce symptoms.   Surgery - Surgery might be an option if other treatments do not work and you have had symptoms for a long time.  Is there anything I can do on my own to feel better?    Rest your shoulder - Avoid lifting things, reaching overhead or across your chest, or sleeping on the shoulder that hurts. If your doctor gave you a sling to support your arm, follow instructions for using it. Or you might get a bandage that goes around your shoulders and upper back instead.   Take medicine to reduce pain and swelling - To treat pain, you can take acetaminophen (sample brand name: Tylenol). To treat pain and swelling, you can take ibuprofen (sample brand names: Advil, Motrin).   Prop your shoulder on pillows - Keep it raised above the level of your heart. This can help with pain and swelling.   Ice your shoulder - Put a cold gel pack, bag of ice, or bag of " frozen vegetables on the injured area every 1 to 2 hours, for 15 minutes each time. Put a thin towel between the ice (or other cold object) and your skin. Use the ice (or other cold object) for at least 6 hours after your injury. Some people find it helpful to ice longer, even up to 2 days after their injury. Ice can also be helpful after doing shoulder exercises.   Put heat on the area to reduce pain and stiffness - Do not use heat for more than 20 minutes at a time. Also, do not use anything too hot that could burn your skin.   Do pendulum exercises to keep your shoulder from getting too stiff (figure 2):   Let your arm relax and hang down while you sit or stand. Gently move your arm:   Back and forth at your side   Side to side across your body   Around in small circles   Do this exercise for 5 minutes, 1 or 2 times a day.   Start slowly, and make the exercises harder over time. For example, make small circles with your arm at first. Over time, make bigger circles or hold weights in your hand.   Your doctor, nurse, or physical therapist can show you how to do other exercises to strengthen the muscles around your shoulder. They will tell you when to start them and how often to do them.   Before exercising your shoulder, warm up the muscles first. You can do this by taking a hot shower or bath, or using a heating pad. Some soreness is normal. If you have sharp, tearing, or worsening pain, stop what you're doing and let your doctor or nurse know.  When should I call the doctor? -- Call for emergency help right away (in the [place] and [place], call 9-1-1) if:   You have shoulder pain and start to have trouble breathing or bad chest discomfort.  Call the doctor or nurse for advice if:   You have very bad pain that is not helped by medicines.   Your hand or arm becomes weak or swollen.   Your fingers are numb, tingly, or blue or gray in color.  All topics are updated as new evidence becomes available and our peer review  "process is complete.  This topic retrieved from [employer] on: Apr 25, 2024.  Topic 050234 Version 3.0  Release: 32.4.2 - C32.114  © 2024 [employer]. and/or its affiliates. All rights reserved.  figure 1: Parts of the shoulder     These are the different parts of the shoulder as viewed from the front (A) and the back (B). The shoulder joint is made up of the upper arm bone, collarbone, and shoulder blade. Ligaments, muscles, and tendons help hold the joint in place and allow you to move your arm. A \"bursa\" is a small fluid-filled sac that sits near a bone. It cushions and protects nearby tissues when they rub on or slide over bones.  Graphic [id number] Version 1.0  figure 2: Pendulum exercises     Letyour arm relax and hang down while you sit or stand. Gently move your arm backand forth at your side, then side to side across your body, and then around insmall circles. Do this exercise for 5 minutes, 1 or 2 times a day. Youcan make this exercise harder by making bigger movements or holding a smallweight in your hand.  Graphic 244779 Version 1.0  Consumer Information Use and Disclaimer   Disclaimer: This generalized information is a limited summary of diagnosis, treatment, and/or medication information. It is not meant to be comprehensive and should be used as a tool to help the user understand and/or assess potential diagnostic and treatment options. It does NOT include all information about conditions, treatments, medications, side effects, or risks that may apply to a specific patient. It is not intended to be medical advice or a substitute for the medical advice, diagnosis, or treatment of a health care provider based on the health care provider's examination and assessment of a patient's specific and unique circumstances. Patients must speak with a health care provider for complete information about their health, medical questions, and treatment options, including any risks or benefits regarding use of medications. " This information does not endorse any treatments or medications as safe, effective, or approved for treating a specific patient. UpToDate, Inc. and its affiliates disclaim any warranty or liability relating to this information or the use thereof.The use of this information is governed by the Terms of Use, available at https://www.Luxodo.com/en/know/clinical-effectiveness-terms. 2024© UpToDate, Inc. and its affiliates and/or licensors. All rights reserved.  Copyright   © 2024 UpToDate, Inc. and/or its affiliates. All rights reserved.

## 2025-06-07 NOTE — PROGRESS NOTES
":  Assessment & Plan  Acute pain of left shoulder    Orders:    Ambulatory referral to Orthopedic Surgery; Future    cyclobenzaprine (FLEXERIL) 10 mg tablet; Take 1 tablet (10 mg total) by mouth daily at bedtime for 5 days    Ambulatory referral to Physical Therapy; Future    Suspect rotator cuff tear with now frozen shoulder. Patient referred to Ortho.  Muscle relaxer as needed at night to help with the muscle stiffness.    History of Present Illness     Mary Gonzalez is a 55 y.o. female   Patient is a 55 year old female presenting today with left shoulder pain x 4. She reports she was at work when the pain started. Denies trauma or injury. She notes pain is more significant with movement and sleeping. She notes a stiff feeling in her trapezius area as well. She no new neck, elbow or wrist pain. Denies fevers or chills. Notes took Ibuprofen yesterday which aided in pain. She denies prior injury.    Shoulder Pain       Review of Systems   Constitutional:  Negative for chills and fatigue.   Respiratory:  Negative for cough and shortness of breath.    Cardiovascular:  Negative for chest pain.   Musculoskeletal:  Positive for arthralgias (Left shoulder pain). Negative for back pain and neck pain.   Skin:  Negative for color change and rash.     Objective   /66   Pulse 66   Temp 97.5 °F (36.4 °C)   Resp 16   Ht 5' 3\" (1.6 m)   Wt 57.4 kg (126 lb 9.6 oz)   LMP  (LMP Unknown)   SpO2 97%   BMI 22.43 kg/m²      Physical Exam  Constitutional:       General: She is not in acute distress.     Appearance: Normal appearance. She is not ill-appearing, toxic-appearing or diaphoretic.   HENT:      Head: Normocephalic and atraumatic.   Neck:      Vascular: No carotid bruit.     Cardiovascular:      Rate and Rhythm: Normal rate and regular rhythm.      Pulses: Normal pulses.      Heart sounds: Normal heart sounds. No murmur heard.     No friction rub. No gallop.   Pulmonary:      Effort: Pulmonary effort is normal. No " respiratory distress.      Breath sounds: Normal breath sounds. No stridor. No wheezing, rhonchi or rales.   Chest:      Chest wall: No tenderness.     Musculoskeletal:         General: Tenderness present.      Right shoulder: Normal.      Left shoulder: Tenderness (to palpation of the posterior aspect of left shoulder) present. No swelling or bony tenderness. Decreased range of motion (Due to pain).      Right upper arm: Normal. No swelling or tenderness.      Left upper arm: Normal. No swelling or tenderness.      Right elbow: Normal. No swelling. Normal range of motion. No tenderness.      Left elbow: Normal. No swelling. Normal range of motion. No tenderness.      Right wrist: Normal. No tenderness. Normal range of motion.      Left wrist: Normal. No tenderness. Normal range of motion.      Cervical back: Normal, normal range of motion and neck supple. No rigidity, tenderness or bony tenderness. Normal range of motion.   Lymphadenopathy:      Cervical: No cervical adenopathy.     Skin:     General: Skin is warm and dry.      Capillary Refill: Capillary refill takes less than 2 seconds.     Neurological:      General: No focal deficit present.      Mental Status: She is alert and oriented to person, place, and time.     Psychiatric:         Mood and Affect: Mood normal.         Behavior: Behavior normal.

## 2025-06-09 ENCOUNTER — OFFICE VISIT (OUTPATIENT)
Dept: OBGYN CLINIC | Facility: CLINIC | Age: 55
End: 2025-06-09
Payer: COMMERCIAL

## 2025-06-09 ENCOUNTER — HOSPITAL ENCOUNTER (OUTPATIENT)
Dept: RADIOLOGY | Facility: HOSPITAL | Age: 55
Discharge: HOME | End: 2025-06-09
Attending: ORTHOPAEDIC SURGERY
Payer: COMMERCIAL

## 2025-06-09 VITALS — HEIGHT: 63 IN | BODY MASS INDEX: 22.5 KG/M2 | WEIGHT: 127 LBS

## 2025-06-09 DIAGNOSIS — M25.512 LEFT SHOULDER PAIN, UNSPECIFIED CHRONICITY: ICD-10-CM

## 2025-06-09 DIAGNOSIS — M77.8 LEFT SHOULDER TENDONITIS: Primary | ICD-10-CM

## 2025-06-09 DIAGNOSIS — M54.2 NECK PAIN: ICD-10-CM

## 2025-06-09 PROCEDURE — 72050 X-RAY EXAM NECK SPINE 4/5VWS: CPT

## 2025-06-09 PROCEDURE — 99204 OFFICE O/P NEW MOD 45 MIN: CPT | Performed by: ORTHOPAEDIC SURGERY

## 2025-06-09 PROCEDURE — 73030 X-RAY EXAM OF SHOULDER: CPT

## 2025-06-09 RX ORDER — METHYLPREDNISOLONE 4 MG/1
TABLET ORAL
Qty: 1 EACH | Refills: 0 | Status: SHIPPED | OUTPATIENT
Start: 2025-06-09

## 2025-06-09 NOTE — PROGRESS NOTES
[facility] [address] [id number] [phone]       Mary Gonzalez  [id number]  1970    ORTHOPAEDIC SURGERY OUTPATIENT NOTE  6/9/2025    :  Assessment & Plan  Left shoulder tendonitis  Patient with left shoulder tendonitis   X-ray left shoulder was obtained and reviewed in the office today which showed no acute fractures or dislocations, no acute osseous abnormality   Patient on exam does have stiffness with internal rotation and weakness with external rotation   Discussed with patient conservative treatment includes physical therapy and medications  Patient prescribed patient a Medrol Dosepak to help decrease the inflammation  Therapy order was placed for stretching and strengthening conditioning exercises  Discussed with patient to get physical therapy 6 weeks if she has no improvement she is to return back into the office for reevaluation  Orders:    Ambulatory referral to Physical Therapy; Future    Neck pain  Patient with degenerative disc disease at C5-C6 and C6-7 with reverse lordosis and anterior subluxation with flexion and C4-C5  X-ray cervical spine was obtained and reviewed in the office today demonstrating loss of lordosis, reversal lordosis , degenerative disc disease at C5-C6 and C6-C7, Anterior subluxation C4-C5 with flexion  Orders:    XR spine cervical complete 4 or 5 vw non injury; Future    Left shoulder pain, unspecified chronicity    Orders:    XR shoulder 2+ vw left; Future    Ambulatory referral to Physical Therapy; Future           HISTORY:  55 y.o. female  presents today evaluation for left shoulder. She was referred by [facility] . 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 . She denies any injury. She notes pain with pain movement and sleeping. She was seen at [facility] and was diagnosed with frozen shoulder with  "possible rotator cuff tear and was placed Cyclobenzaprine. She notes she is having pain in the left trap and radiating down to the lateral shoulder. She denies numbness or tingling. She notes the the muscle relaxer make her feel loopy. She is RHD.        Past Medical History[1]    Past Surgical History[2]    Social History     Socioeconomic History    Marital status:      Spouse name: Not on file    Number of children: Not on file    Years of education: Not on file    Highest education level: Not on file   Occupational History    Not on file   Tobacco Use    Smoking status: Never     Passive exposure: Past    Smokeless tobacco: Never   Vaping Use    Vaping status: Never Used   Substance and Sexual Activity    Alcohol use: Never    Drug use: Never    Sexual activity: Not on file   Other Topics Concern    Not on file   Social History Narrative    Not on file     Social Drivers of Health     Financial Resource Strain: Not on file   Food Insecurity: Not on file   Transportation Needs: Not on file   Physical Activity: Not on file   Stress: Not on file   Social Connections: Not on file   Intimate Partner Violence: Not on file   Housing Stability: Not on file       Family History[3]     Patient's Medications   New Prescriptions    No medications on file   Previous Medications    CYCLOBENZAPRINE (FLEXERIL) 10 MG TABLET    Take 1 tablet (10 mg total) by mouth daily at bedtime for 5 days    ESTRADIOL (VIVELLE-DOT) 0.025 MG/24HR    Place 1 patch on the skin 2 (two) times a week    GABAPENTIN (NEURONTIN) 300 MG CAPSULE    Take 300 mg by mouth daily at bedtime   Modified Medications    No medications on file   Discontinued Medications    No medications on file       Allergies[4]     Ht 5' 3\" (1.6 m)   Wt 57.6 kg (127 lb)   LMP  (LMP Unknown)   BMI 22.50 kg/m²      REVIEW OF SYSTEMS:  Constitutional: Negative.    HEENT: Negative.    Respiratory: Negative.    Skin: Negative.    Neurological: Negative.  "   Psychiatric/Behavioral: Negative.  Musculoskeletal: Negative except for that mentioned in the HPI.    Gen: No acute distress, resting comfortably in bed  HEENT: Eyes clear, moist mucus membranes, hearing intact  Respiratory: No audible wheezing or stridor  Cardiovascular: Well Perfused peripherally, 2+ distal pulse  Abdomen: nondistended, no peritoneal signs     PHYSICAL EXAM:    LEFT SHOULDER:    Appearance: Skin intact     Forward flexion:   180 degrees   Abduction:  180 degrees   External rotation at 90 degrees abduction:   90 degrees   Internal rotation at 90 degrees abduction:  90 degrees   External rotation at 0 degrees:   70 degrees   Internal rotation: T7     STRENGTH:  Forward flexion:  5/5   Abduction:  5/5   External rotation:  4/5   Internal rotation:  5/5        Speed test: Negative  Yergason's: Negative   Tender to palpation ACJ (acromioclavicular joint): Negative   Tender to palpation LHB (long head of biceps): Negative  Ontiveros test: Negative  [place] test: +   Hornblower's: Negative  Lift off: Negative  Belly press: Negative  Bear hug: Negative  External lag sign: Negative  Cross-body adduction: Negative  Sulcus sign: Negative  Sherley's test: +   Drop arm test: negative    Radial/median/ulnar nerve intact    <2 sec cap refill       IMAGING:      X-ray left shoulder demonstrates no acute osseous abnormality no acute fractures or dislocations       X-ray cervical spine was obtained and reviewed in the office today demonstrating loss of lordosis, reversal lordosis , degenerative disc disease at C5-C6 and C6-C7, Anterior subluxation C4-C5 with flexion    Scribe Attestation      I,:  Elisabet Maloney MA am acting as a scribe while in the presence of the attending physician.:       I,:  Helga Joshi DO personally performed the services described in this documentation    as scribed in my presence.:                  [1] No past medical history on file.  [2]   Past Surgical History:  Procedure  Laterality Date    HYSTERECTOMY      ovaries and cervix spared    TONSILLECTOMY AND ADENOIDECTOMY     [3]   Family History  Problem Relation Name Age of Onset    COPD Mother      COPD Father      Coronary artery disease Father      Lupus Sister      Rheum arthritis Sister      Colon cancer Maternal Grandmother      Thyroid disease Maternal Grandmother      Schizophrenia Paternal Grandmother      Diabetes Paternal Uncle     [4] No Known Allergies